# Patient Record
Sex: FEMALE | Race: WHITE | NOT HISPANIC OR LATINO | Employment: STUDENT | URBAN - METROPOLITAN AREA
[De-identification: names, ages, dates, MRNs, and addresses within clinical notes are randomized per-mention and may not be internally consistent; named-entity substitution may affect disease eponyms.]

---
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
## 2023-12-10 ENCOUNTER — HOSPITAL ENCOUNTER (EMERGENCY)
Facility: HOSPITAL | Age: 18
Discharge: HOME | End: 2023-12-11
Attending: EMERGENCY MEDICINE | Admitting: EMERGENCY MEDICINE
Payer: COMMERCIAL

## 2023-12-10 VITALS
RESPIRATION RATE: 18 BRPM | OXYGEN SATURATION: 98 % | WEIGHT: 115 LBS | SYSTOLIC BLOOD PRESSURE: 121 MMHG | HEART RATE: 78 BPM | TEMPERATURE: 97.3 F | DIASTOLIC BLOOD PRESSURE: 83 MMHG

## 2023-12-10 DIAGNOSIS — N12 PYELONEPHRITIS: Primary | ICD-10-CM

## 2023-12-10 DIAGNOSIS — R31.9 HEMATURIA: ICD-10-CM

## 2023-12-10 LAB
BILIRUB UR QL STRIP: ABNORMAL
BILIRUB UR QL STRIP: NEGATIVE
CLARITY UR: ABNORMAL
CLARITY UR: CLEAR
COLOR UR: ABNORMAL
COLOR UR: ABNORMAL
EXT PREGNANCY TEST URINE: NEGATIVE
EXT. CONTROL: NORMAL
GLUCOSE UR STRIP-MCNC: ABNORMAL MG/DL
GLUCOSE UR STRIP-MCNC: NEGATIVE MG/DL
HGB UR QL STRIP.AUTO: ABNORMAL
HGB UR QL STRIP.AUTO: ABNORMAL
KETONES UR STRIP-MCNC: NEGATIVE MG/DL
KETONES UR STRIP-MCNC: NEGATIVE MG/DL
LEUKOCYTE ESTERASE UR QL STRIP: ABNORMAL
LEUKOCYTE ESTERASE UR QL STRIP: ABNORMAL
NITRITE UR QL STRIP: POSITIVE
NITRITE UR QL STRIP: POSITIVE
PH UR STRIP.AUTO: 7.5 [PH]
PH UR STRIP.AUTO: 7.5 [PH] (ref 4.5–8)
PROT UR STRIP-MCNC: >=300 MG/DL
PROT UR STRIP-MCNC: ABNORMAL MG/DL
SP GR UR STRIP.AUTO: 1.01 (ref 1–1.03)
SP GR UR STRIP.AUTO: 1.02 (ref 1–1.03)
UROBILINOGEN UR QL STRIP.AUTO: 1 E.U./DL
UROBILINOGEN UR STRIP-ACNC: <2 MG/DL

## 2023-12-10 PROCEDURE — 87077 CULTURE AEROBIC IDENTIFY: CPT

## 2023-12-10 PROCEDURE — 87086 URINE CULTURE/COLONY COUNT: CPT

## 2023-12-10 PROCEDURE — 81025 URINE PREGNANCY TEST: CPT

## 2023-12-10 PROCEDURE — 99283 EMERGENCY DEPT VISIT LOW MDM: CPT

## 2023-12-10 PROCEDURE — 81001 URINALYSIS AUTO W/SCOPE: CPT

## 2023-12-10 PROCEDURE — 87186 SC STD MICRODIL/AGAR DIL: CPT

## 2023-12-10 PROCEDURE — 99284 EMERGENCY DEPT VISIT MOD MDM: CPT | Performed by: EMERGENCY MEDICINE

## 2023-12-10 RX ORDER — CIPROFLOXACIN 500 MG/1
500 TABLET, FILM COATED ORAL 2 TIMES DAILY
Qty: 14 TABLET | Refills: 0 | Status: SHIPPED | OUTPATIENT
Start: 2023-12-10 | End: 2023-12-17

## 2023-12-10 RX ORDER — PHENAZOPYRIDINE HYDROCHLORIDE 100 MG/1
100 TABLET, FILM COATED ORAL ONCE
Status: COMPLETED | OUTPATIENT
Start: 2023-12-11 | End: 2023-12-11

## 2023-12-10 NOTE — Clinical Note
Zoe Garzon was seen and treated in our emergency department on 12/10/2023. Diagnosis: Pyelonephritis    Mobile Stain  may return to school on return date. She may return on this date: 12/13/2023    Pt is currently sick with pyelonephritis     If you have any questions or concerns, please don't hesitate to call.       Velvet Sarmiento, DO    ______________________________           _______________          _______________  Hospital Representative                              Date                                Time

## 2023-12-10 NOTE — Clinical Note
Ace Kami was seen and treated in our emergency department on 12/10/2023. Diagnosis: Pyelonephritis    Abi Scales  may return to school on return date. She may return on this date: 12/13/2023    Pt is currently sick with pyelonephritis     If you have any questions or concerns, please don't hesitate to call.       Nicole Seth, DO    ______________________________           _______________          _______________  Hospital Representative                              Date                                Time

## 2023-12-11 LAB
BACTERIA UR QL AUTO: ABNORMAL /HPF
NON-SQ EPI CELLS URNS QL MICRO: ABNORMAL /HPF
RBC #/AREA URNS AUTO: ABNORMAL /HPF
WBC #/AREA URNS AUTO: ABNORMAL /HPF

## 2023-12-11 PROCEDURE — 96372 THER/PROPH/DIAG INJ SC/IM: CPT

## 2023-12-11 RX ORDER — ONDANSETRON 4 MG/1
4 TABLET, ORALLY DISINTEGRATING ORAL ONCE
Status: COMPLETED | OUTPATIENT
Start: 2023-12-11 | End: 2023-12-11

## 2023-12-11 RX ORDER — PHENAZOPYRIDINE HYDROCHLORIDE 200 MG/1
200 TABLET, FILM COATED ORAL 3 TIMES DAILY
Qty: 6 TABLET | Refills: 0 | Status: SHIPPED | OUTPATIENT
Start: 2023-12-10

## 2023-12-11 RX ADMIN — PHENAZOPYRIDINE 100 MG: 100 TABLET ORAL at 00:13

## 2023-12-11 RX ADMIN — CEFTRIAXONE 1000 MG: 1 INJECTION, POWDER, FOR SOLUTION INTRAMUSCULAR; INTRAVENOUS at 00:02

## 2023-12-11 RX ADMIN — ONDANSETRON 4 MG: 4 TABLET, ORALLY DISINTEGRATING ORAL at 00:18

## 2023-12-11 NOTE — ED ATTENDING ATTESTATION
12/10/2023  Spring Carballo DO, saw and evaluated the patient. I have discussed the patient with the resident/non-physician practitioner and agree with the resident's/non-physician practitioner's findings, Plan of Care, and MDM as documented in the resident's/non-physician practitioner's note, except where noted. All available labs and Radiology studies were reviewed. I was present for key portions of any procedure(s) performed by the resident/non-physician practitioner and I was immediately available to provide assistance. At this point I agree with the current assessment done in the Emergency Department. I have conducted an independent evaluation of this patient a history and physical is as follows:    18 yo female w/prior hx of UTI most recently 1 mo ago, treated with PO abx, unsure which one. Presents today for hematuria, dysuria, frequency, and unilateral back/flank pain. She has not had any hx of ESBL/MDRO organism that she is aware of. No fever, vomiting. UA reviewed - c/w UTI    Imp: UTI, concern for pyelo given unilateral back pain plan: per IDSA guidelines, single dose of long acting cephalosporin now, rx for FQ, TMP/SMX, or cephalosporin.       ED Course         Critical Care Time  Procedures

## 2023-12-11 NOTE — DISCHARGE INSTRUCTIONS
Take Antibiotics as prescribed to you  If you have worsening symptoms, or develop fever, come back to the ED.

## 2023-12-11 NOTE — ED PROVIDER NOTES
History  Chief Complaint   Patient presents with    Blood in Urine     Pt states that she is having blood in her urine, back, and UTI symptoms since yesterday      HPI  MDM  Pt is a 17-year-old female, presents for couple of days history of dysuria, hematuria urinary frequency, and left-sided flank pain. Has history of UTIs before, no history of kidney stones. Denies fever, nausea, vomiting. No history of any resistant organisms. Denies any vaginal discharge    On exam   General: VSS, NAD, awake, alert. Well-nourished, well-developed. Appears stated age. Speaking normally in full sentences. Head: Normocephalic, atraumatic, nontender. Eyes: PERRL, EOM-I. No diplopia. No hyphema. No subconjunctival hemorrhages. Symmetrical lids. ENT: Atraumatic external nose and ears. MMM  No malocclusion. No stridor. Normal phonation. No drooling. Normal swallowing. Neck: Symmetric, trachea midline. No JVD. CV: RRR. +S1/S2  No murmurs or gallops  Peripheral pulses +2 throughout. No chest wall tenderness. Lungs:   Unlabored No retractions  CTAB, lungs sounds equal bilateral.   No tachypnea. Abd: +BS, soft, NT/ND. Left-sided CVA tenderness. MSK:   FROM   Back:   No rashes  Skin: Dry, intact. Neuro: AAOx3, GCS 15, CN II-XII grossly intact. Motor grossly intact. Psychiatric/Behavioral: Appropriate mood and affect   Exam: deferred     Ddx: UTI concerning for Rod. Plan: evaluated with UA and urine prep. UA per my interpretation was remarkable for UTI. Given unilateral flank tenderness, will presume Rod and treat for the same. Given 1 dose of IM Rocephin in the ED and discharged with 10 days of Cipro. Zofran for nausea. Discussed strict return precautions. Final Dispo:Pt is hemodynamically stable and clear for discharge with outpatient f/u with their PCP. Return precautions given pt verbalized understanding      None       History reviewed. No pertinent past medical history.     History reviewed. No pertinent surgical history. History reviewed. No pertinent family history. I have reviewed and agree with the history as documented.     E-Cigarette/Vaping    E-Cigarette Use Current Some Day User      E-Cigarette/Vaping Substances     Social History     Tobacco Use    Smoking status: Some Days     Types: Cigarettes    Smokeless tobacco: Never   Vaping Use    Vaping Use: Some days   Substance Use Topics    Alcohol use: Yes     Comment: socially    Drug use: Yes     Types: Marijuana        Review of Systems    Physical Exam  ED Triage Vitals [12/10/23 2227]   Temperature Pulse Respirations Blood Pressure SpO2   (!) 97.3 °F (36.3 °C) 78 18 121/83 98 %      Temp Source Heart Rate Source Patient Position - Orthostatic VS BP Location FiO2 (%)   Oral Monitor Sitting Left arm --      Pain Score       10 - Worst Possible Pain             Orthostatic Vital Signs  Vitals:    12/10/23 2227   BP: 121/83   Pulse: 78   Patient Position - Orthostatic VS: Sitting       Physical Exam    ED Medications  Medications   cefTRIAXone (ROCEPHIN) 1,000 mg in lidocaine (PF) (XYLOCAINE-MPF) 1 % IM only syringe (1,000 mg Intramuscular Given 12/11/23 0002)   phenazopyridine (PYRIDIUM) tablet 100 mg (100 mg Oral Given 12/11/23 0013)   ondansetron (ZOFRAN-ODT) dispersible tablet 4 mg (4 mg Oral Given 12/11/23 0018)       Diagnostic Studies  Results Reviewed       Procedure Component Value Units Date/Time    Urine culture [[id number]]  (Abnormal) Collected: 12/10/23 2322    Lab Status: Preliminary result Specimen: Urine, Clean Catch Updated: 12/12/23 0831     Urine Culture >100,000 cfu/ml Gram Negative Jorge Enteric Like    Urine Microscopic [[id number]]  (Abnormal) Collected: 12/10/23 2322    Lab Status: Final result Specimen: Urine, Clean Catch Updated: 12/11/23 0011     RBC, UA Innumerable /hpf      WBC, UA Innumerable /hpf      Epithelial Cells None Seen /hpf      Bacteria, UA Occasional /hpf     UA w Reflex to Microscopic w Reflex to Culture [[id number]]  (Abnormal) Collected: 12/10/23 2322    Lab Status: Final result Specimen: Urine, Clean Catch Updated: 12/10/23 2355     Color, UA Dark Orange     Clarity, UA Extra Turbid     Specific Gravity, UA 1.006     pH, UA 7.5     Leukocytes, UA Large     Nitrite, UA Positive     Protein,  (2+) mg/dl      Glucose, UA Negative mg/dl      Ketones, UA Negative mg/dl      Urobilinogen, UA <2.0 mg/dl      Bilirubin, UA Negative     Occult Blood, UA Large    POCT pregnancy, urine [[id number]]  (Normal) Resulted: 12/10/23 2321    Lab Status: Final result Updated: 12/10/23 2322     EXT Preg Test, Ur Negative     Control Valid    Urine Macroscopic, POC [[id number]]  (Abnormal) Collected: 12/10/23 2319    Lab Status: Final result Specimen: Urine Updated: 12/10/23 2320     Color, UA Red     Clarity, UA Clear     pH, UA 7.5     Leukocytes, UA Large     Nitrite, UA Positive     Protein, UA >=300 mg/dl      Glucose,  (1/10%) mg/dl      Ketones, UA Negative mg/dl      Urobilinogen, UA 1.0 E.U./dl      Bilirubin, UA Small     Occult Blood, UA Large     Specific Gravity, UA 1.020    Narrative:      CLINITEK RESULT                   No orders to display         Procedures  Procedures      ED Course         CRAFFT      Flowsheet Row Most Recent Value   CRAFFT Initial Screen: During the past 12 months, did you:    1. Drink any alcohol (more than a few sips)? No Filed at: 12/10/2023 2229   2. Smoke any marijuana or hashish No Filed at: 12/10/2023 2229   3. Use anything else to get high? ("anything else" includes illegal drugs, over the counter and prescription drugs, and things that you sniff or 'peters')? No Filed at: 12/10/2023 2229                                      Medical Decision Making  Amount and/or Complexity of Data Reviewed  Labs: ordered. Risk  Prescription drug management.           Disposition  Final diagnoses:   Pyelonephritis   Hematuria     Time reflects when diagnosis was documented in both MDM as applicable and the Disposition within this note       Time User Action Codes Description Comment    12/10/2023 11:51 PM Toño Quiroz Add [N12] Pyelonephritis     12/11/2023 12:08 AM Toño Quiroz Add [R31.9] Hematuria           ED Disposition       ED Disposition   Discharge    Condition   Stable    Date/Time   Mon Dec 11, 2023 0008    Comment                  Follow-up Information       Follow up With Specialties Details Why Contact Info Additional [address] [facility] [address] In 1 week  [address] [id number]  [address], [address] [place], [place], [address] [place] [address] Emergency Department Emergency Medicine  If symptoms worsen [address] [address]  [facility] Emergency Department, [address], [place], [place], [facility]            Discharge Medication List as of 12/11/2023 12:08 AM        START taking these medications    Details   ciprofloxacin (CIPRO) 500 mg tablet Take 1 tablet (500 mg total) by mouth 2 (two) times a day for 7 days, Starting Sun 12/10/2023, Until Sun 12/17/2023, Print      phenazopyridine (PYRIDIUM) 200 mg tablet Take 1 tablet (200 mg total) by mouth 3 (three) times a day, Starting Sun 12/10/2023, Print           No discharge procedures on file. PDMP Review       None             ED Provider  Attending physically available and evaluated [facility]. I managed the patient along with the ED Attending.     Electronically Signed by           Toño Quiroz DO  12/12/23 1364

## 2023-12-13 LAB — BACTERIA UR CULT: ABNORMAL

## 2023-12-14 ENCOUNTER — HOSPITAL ENCOUNTER (EMERGENCY)
Facility: HOSPITAL | Age: 18
Discharge: HOME | End: 2023-12-14
Attending: EMERGENCY MEDICINE
Payer: COMMERCIAL

## 2023-12-14 VITALS
SYSTOLIC BLOOD PRESSURE: 117 MMHG | TEMPERATURE: 97.7 F | HEART RATE: 70 BPM | DIASTOLIC BLOOD PRESSURE: 85 MMHG | OXYGEN SATURATION: 99 % | RESPIRATION RATE: 16 BRPM

## 2023-12-14 DIAGNOSIS — M54.50 ACUTE LOW BACK PAIN: ICD-10-CM

## 2023-12-14 DIAGNOSIS — M54.9 BACK PAIN: Primary | ICD-10-CM

## 2023-12-14 PROCEDURE — 99284 EMERGENCY DEPT VISIT MOD MDM: CPT

## 2023-12-14 PROCEDURE — 87491 CHLMYD TRACH DNA AMP PROBE: CPT

## 2023-12-14 PROCEDURE — 87591 N.GONORRHOEAE DNA AMP PROB: CPT

## 2023-12-14 PROCEDURE — 99285 EMERGENCY DEPT VISIT HI MDM: CPT

## 2023-12-14 PROCEDURE — 93005 ELECTROCARDIOGRAM TRACING: CPT

## 2023-12-14 RX ORDER — ACETAMINOPHEN 325 MG/1
650 TABLET ORAL ONCE
Status: COMPLETED | OUTPATIENT
Start: 2023-12-14 | End: 2023-12-14

## 2023-12-14 RX ADMIN — ACETAMINOPHEN 650 MG: 325 TABLET, FILM COATED ORAL at 14:15

## 2023-12-14 NOTE — DISCHARGE INSTRUCTIONS
Patient is advised to call PCP today to make follow-up appointment. Patient should finish full course of antibiotics. Patient should rest and ice his low back as needed for pain relief without applying ice directly to skin. Patient is advised to return to ED with increasing shortness back pain, ascending back pain, painful urination, color change in urine, discharge, fevers, chest pain, diaphoresis, chills, chest pain, shortness of breath.

## 2023-12-14 NOTE — ED PROVIDER NOTES
History  Chief Complaint   Patient presents with    Back Pain     Pt seen here on Sunday and treated for a kidney infection. Still having pain. Noted improvement in the urinary symptoms. Patient is 24-year-old female who presents to the ED with back pain. She states that she was seen in the ED on Sunday for pyelonephritis. She stated that she was treated with ciprofloxacin and Pyridium. She stated that her back pain is still consistent to today. She stated that her urinary symptoms were much better from Sunday and states that she is only experienced slight dysuria and denies discharge, hematuria. She states that she has been having low back pain since Sunday and it has not been getting any better. She rates the pain as a 5 out of 10 and states that she cannot pinpoint the specific area where the pain is. She she states that the pain is nonspecific and there is nothing that aggravates the pain but is intermittent in nature Patient also stated that she is having chest pain over the last couple days that feels like a tightening of her chest.  She states that there is nothing but that initiates the chest pain but just happens intermittently. She denies left arm pain, palpitations, abdominal pain, diaphoresis, headaches, fevers, chills. Prior to Admission Medications   Prescriptions Last Dose Informant Patient Reported? Taking?   ciprofloxacin (CIPRO) 500 mg tablet   No No   Sig: Take 1 tablet (500 mg total) by mouth 2 (two) times a day for 7 days   phenazopyridine (PYRIDIUM) 200 mg tablet   No No   Sig: Take 1 tablet (200 mg total) by mouth 3 (three) times a day      Facility-Administered Medications: None       History reviewed. No pertinent past medical history. History reviewed. No pertinent surgical history. History reviewed. No pertinent family history. I have reviewed and agree with the history as documented.     E-Cigarette/Vaping    E-Cigarette Use Current Some Day User E-Cigarette/Vaping Substances     Social History     Tobacco Use    Smoking status: Some Days     Types: Cigarettes    Smokeless tobacco: Never   Vaping Use    Vaping status: Some Days   Substance Use Topics    Alcohol use: Yes     Comment: socially    Drug use: Yes     Types: Marijuana       Review of Systems   Constitutional:  Negative for chills, diaphoresis, fatigue and fever. HENT:  Negative for congestion, ear discharge, ear pain, hearing loss, nosebleeds, rhinorrhea and sinus pressure. Eyes:  Negative for pain. Respiratory:  Negative for cough and shortness of breath. Cardiovascular:  Positive for chest pain. Negative for palpitations. Gastrointestinal:  Negative for abdominal distention and abdominal pain. Genitourinary:  Positive for dysuria and urgency. Negative for difficulty urinating and hematuria. Musculoskeletal:  Positive for back pain. Negative for arthralgias and myalgias. Skin:  Negative for color change, rash and wound. Neurological:  Negative for dizziness, light-headedness, numbness and headaches. Physical Exam  Physical Exam  Constitutional:       Appearance: Normal appearance. She is normal weight. HENT:      Head: Normocephalic. Nose: Nose normal.      Mouth/Throat:      Mouth: Mucous membranes are moist.   Eyes:      Conjunctiva/sclera: Conjunctivae normal.   Cardiovascular:      Rate and Rhythm: Normal rate and regular rhythm. Pulses: Normal pulses. Heart sounds: Normal heart sounds. No murmur heard. Pulmonary:      Effort: Pulmonary effort is normal.      Breath sounds: Normal breath sounds. Abdominal:      General: Bowel sounds are normal.      Palpations: Abdomen is soft. Tenderness: There is abdominal tenderness. There is no right CVA tenderness or left CVA tenderness. Comments: Tenderness to palpation in the lower abdomen throughout both lower quadrants. Tenderness over suprapubic area.   Negative for costovertebral angle tenderness bilateral   Musculoskeletal:         General: Normal range of motion. Cervical back: Normal range of motion. Comments:   Patient states that she is having low back pain that is nonspecific in nature. She has tenderness palpation over the lumbar region. Skin:     General: Skin is warm and dry. Neurological:      General: No focal deficit present. Mental Status: She is alert and oriented to person, place, and time. Psychiatric:         Mood and Affect: Mood normal.         Vital Signs  ED Triage Vitals [12/14/23 1301]   Temperature Pulse Respirations Blood Pressure SpO2   97.7 °F (36.5 °C) 70 16 117/85 99 %      Temp Source Heart Rate Source Patient Position - Orthostatic VS BP Location FiO2 (%)   Tympanic -- -- -- --      Pain Score       7           Vitals:    12/14/23 1301   BP: 117/85   Pulse: 70         Visual Acuity      ED Medications  Medications   acetaminophen (TYLENOL) tablet 650 mg (650 mg Oral Given 12/14/23 1415)       Diagnostic Studies  Results Reviewed       Procedure Component Value Units Date/Time    Chlamydia/GC amplified DNA by PCR [[id number]] Collected: 12/14/23 1424    Lab Status:  In process Specimen: Urine, Other Updated: 12/14/23 1431                   No orders to display              Procedures  ECG 12 Lead Documentation Only    Date/Time: 12/14/2023 3:14 PM    Performed by: Kwkau Valerio PA-C  Authorized by: Kwaku Valerio PA-C    ECG reviewed by me, the ED Provider: yes    Previous ECG:     Previous ECG:  Compared to current    Similarity:  No change  Interpretation:     Interpretation: normal    Rate:     ECG rate:  60    ECG rate assessment: normal    Rhythm:     Rhythm: sinus rhythm    Ectopy:     Ectopy: none    QRS:     QRS axis:  Normal  Conduction:     Conduction: normal    ST segments:     ST segments:  Normal  T waves:     T waves: normal             ED Course             HEART Risk Score      Flowsheet Row Most Recent Value   Heart Score Risk Calculator    History --   ECG 0 Filed at: 12/14/2023 1536   Age 0 Filed at: 12/14/2023 1536   Risk Factors 0 Filed at: 12/14/2023 1536   Troponin --   HEART Score --                                        Medical Decision Making  Patient reports to the ED with chief complaint of back pain. Recently treated for pyelonephritis. States that she has been having lingering back pain since being treated for infection. States that her urinary symptoms have significantly improved still has some dysuria. Physical exam negative for CVA tenderness. Tenderness of the paraspinal muscles of the lumbar spine. States that there was nothing that irritated her back or no injury. Patient was given acetaminophen for back pain. Urinary symptoms improving and no costovertebral angle tenderness follow-up UA not deemed necessary. The patient having no costovertebral angle tenderness decision to image to the kidneys was not deemed necessary. Plan is to finish ciprofloxacin antibiotics for full course. Patient was advised to use acetaminophen for low back pain. Gonorrhea chlamydia test pending on discharge with follow-up call with any positive results. EKG ordered as patient mentioned chest pain on occasion over the last couple days. No tenderness to over the area of pain. Pain was described as tightening. EKG was normal and patient had little to no risk factors for any cardiac syndrome. Thus excluding further evaluation. Patient was advised to call PCP to make follow-up appointment. Due to patient living outside the area for college was provided. Patient should return to the ED with any increasing chest pain, shortness of breath, numbness of extremities, diaphoresis, shortness of breath, ascending low back pain or worsening low back pain, dysuria, hematuria, and fevers.   Patient understood diagnosis and plan and had no further questions    Amount and/or Complexity of Data Reviewed  External Data Reviewed: labs.  Labs: ordered. Risk  OTC drugs. Disposition  Final diagnoses:   Back pain   Acute low back pain     Time reflects when diagnosis was documented in both MDM as applicable and the Disposition within this note       Time User Action Codes Description Comment    12/14/2023  3:02 PM Kwaku Lanier [M54.9] Back pain     12/14/2023  3:02 PM Kwaku Lanier [M54.50] Acute low back pain           ED Disposition       ED Disposition   Discharge    Condition   Stable    Date/Time   Thu Dec 14, 2023 1020 [address] discharge to home/self care. Follow-up Information       Follow up With Specialties Details Why Contact Info Additional [address] [facility] Call today  [address] [place] [id number]  [address] [facility], [address] [place] [address], [phone]            Discharge Medication List as of 12/14/2023  3:11 PM        CONTINUE these medications which have NOT CHANGED    Details   ciprofloxacin (CIPRO) 500 mg tablet Take 1 tablet (500 mg total) by mouth 2 (two) times a day for 7 days, Starting Sun 12/10/2023, Until Sun 12/17/2023, Print      phenazopyridine (PYRIDIUM) 200 mg tablet Take 1 tablet (200 mg total) by mouth 3 (three) times a day, Starting Sun 12/10/2023, Print             No discharge procedures on file.     PDMP Review       None            ED Provider  Electronically Signed by             Kwaku Valerio PA-C  12/14/23 8385

## 2023-12-15 LAB
ATRIAL RATE: 60 BPM
C TRACH DNA SPEC QL NAA+PROBE: NEGATIVE
N GONORRHOEA DNA SPEC QL NAA+PROBE: NEGATIVE
P AXIS: 60 DEGREES
PR INTERVAL: 150 MS
QRS AXIS: 83 DEGREES
QRSD INTERVAL: 82 MS
QT INTERVAL: 396 MS
QTC INTERVAL: 396 MS
T WAVE AXIS: 61 DEGREES
VENTRICULAR RATE: 60 BPM